# Patient Record
Sex: MALE | Race: WHITE | NOT HISPANIC OR LATINO | Employment: OTHER | URBAN - METROPOLITAN AREA
[De-identification: names, ages, dates, MRNs, and addresses within clinical notes are randomized per-mention and may not be internally consistent; named-entity substitution may affect disease eponyms.]

---

## 2017-04-03 ENCOUNTER — OFFICE VISIT (OUTPATIENT)
Dept: INTERNAL MEDICINE | Facility: CLINIC | Age: 72
End: 2017-04-03

## 2017-04-03 ENCOUNTER — OFFICE VISIT (OUTPATIENT)
Dept: AUDIOLOGY | Facility: CLINIC | Age: 72
End: 2017-04-03

## 2017-04-03 VITALS
SYSTOLIC BLOOD PRESSURE: 125 MMHG | TEMPERATURE: 97.7 F | BODY MASS INDEX: 27.11 KG/M2 | HEART RATE: 59 BPM | DIASTOLIC BLOOD PRESSURE: 80 MMHG | OXYGEN SATURATION: 98 % | WEIGHT: 189.4 LBS | HEIGHT: 70 IN

## 2017-04-03 DIAGNOSIS — H90.5 SENSORINEURAL HEARING LOSS: ICD-10-CM

## 2017-04-03 DIAGNOSIS — Z76.89 ENCOUNTER TO ESTABLISH CARE: ICD-10-CM

## 2017-04-03 DIAGNOSIS — R60.0 EDEMA OF RIGHT LOWER EXTREMITY: ICD-10-CM

## 2017-04-03 DIAGNOSIS — H93.13 TINNITUS, BILATERAL: ICD-10-CM

## 2017-04-03 DIAGNOSIS — H90.3 SENSORINEURAL HEARING LOSS, BILATERAL: Primary | ICD-10-CM

## 2017-04-03 DIAGNOSIS — Z00.00 ROUTINE HISTORY AND PHYSICAL EXAMINATION OF ADULT: Primary | ICD-10-CM

## 2017-04-03 PROBLEM — K21.9 GASTROESOPHAGEAL REFLUX DISEASE: Status: ACTIVE | Noted: 2017-03-01

## 2017-04-03 RX ORDER — FAMOTIDINE 20 MG
TABLET ORAL
COMMUNITY

## 2017-04-03 RX ORDER — ZOLPIDEM TARTRATE 10 MG/1
10 TABLET ORAL
COMMUNITY
Start: 2016-08-09

## 2017-04-03 RX ORDER — AMOXICILLIN 500 MG/1
CAPSULE ORAL
COMMUNITY
Start: 2016-10-17

## 2017-04-03 RX ORDER — LEVOTHYROXINE SODIUM 125 UG/1
125 TABLET ORAL
COMMUNITY
Start: 2016-04-06

## 2017-04-03 RX ORDER — ACETAMINOPHEN 500 MG
500 TABLET ORAL
COMMUNITY
Start: 2016-07-29

## 2017-04-03 RX ORDER — ATENOLOL 25 MG/1
TABLET ORAL
COMMUNITY
Start: 2017-02-09

## 2017-04-03 RX ORDER — LEVOFLOXACIN 750 MG/1
TABLET, FILM COATED ORAL
COMMUNITY
Start: 2017-03-20

## 2017-04-03 RX ORDER — SILDENAFIL 50 MG/1
25 TABLET, FILM COATED ORAL
COMMUNITY
Start: 2017-03-02

## 2017-04-03 RX ORDER — SIMVASTATIN 20 MG
TABLET ORAL
COMMUNITY
Start: 2016-09-06

## 2017-04-03 RX ORDER — ASPIRIN 325 MG
325 TABLET ORAL
COMMUNITY

## 2017-04-03 ASSESSMENT — PAIN SCALES - GENERAL: PAINLEVEL: NO PAIN (1)

## 2017-04-03 NOTE — NURSING NOTE
Chief Complaint   Patient presents with     Physical     Patient here for a physical for work.     Imelda Taveras LPN at 7:23 AM on 4/3/2017.

## 2017-04-03 NOTE — MR AVS SNAPSHOT
After Visit Summary   4/3/2017    Juan Beckett    MRN: [id number]           Patient Information     Date Of Birth          1945        Visit Information        Provider Department      4/3/2017 11:00 AM Dyan Bolivar AuD [facility] Audiology        Today's Diagnoses     Sensorineural hearing loss, bilateral    -  1    Tinnitus, bilateral           Follow-ups after your visit        Who to contact     Please call your clinic at [phone] to:    Ask questions about your health    Make or cancel appointments    Discuss your medicines    Learn about your test results    Speak to your doctor   If you have compliments or concerns about an experience at your clinic, or if you wish to file a complaint, please contact [facility] Patient Relations at [phone] or email us at [email or web address]         Additional Information About Your Visit        MyChart Information     Hassle.com is an electronic gateway that provides easy, online access to your medical records. With Hassle.com, you can request a clinic appointment, read your test results, renew a prescription or communicate with your care team.     To sign up for Apax Solutionst visit the website at [email or web address]   You will be asked to enter the access code listed below, as well as some personal information. Please follow the directions to create your username and password.     Your access code is: [email or web address]  Expires: 2017  6:30 AM     Your access code will  in 90 days. If you need help or a new code, please contact your [facility] or call [phone] for assistance.        Care EveryWhere ID     This is your Care EveryWhere ID. This could be used by other organizations to access your [place] medical records  [id number]         Blood Pressure from Last 3 Encounters:   17 125/80    Weight from Last 3 Encounters:   17 85.9 kg (189 lb 6.4 oz)               We Performed the Following     AUDIOGRAM/TYMPANOGRAM - INTERFACE     [facility] Audiometry Thrshld Eval & Speech Recog (01771)     Tymps / Reflex   (62969)        Primary Care Provider    None Specified       No primary provider on file.        Thank you!     Thank you for choosing [facility] AUDIOLOGY  for your care. Our goal is always to provide you with excellent care. Hearing back from our patients is one way we can continue to improve our services. Please take a few minutes to complete the written survey that you may receive in the mail after your visit with us. Thank you!             Your Updated Medication List - Protect others around you: Learn how to safely use, store and throw away your medicines at www.disposemymeds.org.          This list is accurate as of: 4/3/17 12:50 PM.  Always use your most recent med list.                   Brand Name Dispense Instructions for use    acetaminophen 500 MG tablet    TYLENOL     Take 500 mg by mouth       amoxicillin 500 MG capsule    AMOXIL     TAKE 4 CAPSULES BY MOUTH 1 HOUR BEFORE DENTAL APPOINTMENT       aspirin 325 MG tablet      Take 325 mg by mouth       atenolol 25 MG tablet    TENORMIN     TAKE 1 TABLET BY MOUTH TWICE DAILY       esomeprazole 20 MG CR capsule    nexIUM     Take 20 mg by mouth       levofloxacin 750 MG tablet    LEVAQUIN     TAKE 1 TABLET(750 MG) BY MOUTH EVERY DAY AS DIRECTED       levothyroxine 125 MCG tablet    SYNTHROID/LEVOTHROID     Take 125 mcg by mouth       MULTIVITAMIN & MINERAL PO      Take 1 tablet by mouth       sildenafil 50 MG cap/tab    REVATIO/VIAGRA     Take 25 mg by mouth       simvastatin 20 MG tablet    ZOCOR     TAKE 1 TABLET BY MOUTH EVERY NIGHT AT BEDTIME.       Vitamin D (Cholecalciferol) 1000 UNITS Caps          zolpidem 10 MG tablet    AMBIEN     Take 10 mg by mouth

## 2017-04-03 NOTE — MR AVS SNAPSHOT
After Visit Summary   4/3/2017    Juan Beckett    MRN: [id number]           Patient Information     Date Of Birth          1945        Visit Information        Provider Department      4/3/2017 7:30 AM Kisha Mccullough APRN [facility]        Today's Diagnoses     Sensorineural hearing loss    -  1    Edema of right lower extremity           Follow-ups after your visit        Additional Services     AUDIOLOGY ADULT REFERRAL       Your provider has referred you to: [facility]: Audiology and Aural Rehab Services - [place] [phone]   [email or web address]    Specialty Testing:  Audiogram w/Tymps and Reflexes (Comprehensive Audiology Evaluation)                  Your next 10 appointments already scheduled     Apr 03, 2017  9:45 AM CDT   US LOWER EXTREMITY VENOUS DUPLEX RIGHT with UCUS2   [facility] US ([facility])    [address] [place] [address]  1st [facility] [address] [phone]           Please bring a list of your medicines (including vitamins, minerals and over-the-counter drugs). Also, tell your doctor about any allergies you may have. Wear comfortable clothes and leave your valuables at home.  You do not need to do anything special to prepare for your exam.  Please call the Imaging Department at your exam site with any questions.            Apr 03, 2017 11:00 AM CDT   (Arrive by 10:45 AM)   Hearing Evaluation with Piotr Rueda   [facility] ([facility])    [address] [place] [address]  4th Floor  [facility] [address] [phone]           Please see your medical professional for ear cleaning prior to this appointment if you believe wax buildup may be an issue. All patients are required to have a physician's order stating the medical reason for the hearing test. Your doctor can send an electronic order, use their own form  or we have provided a form (called Physician's Order for Audiology Services). It states that there is a medical reason for your exam. Without an order you may need to be rescheduled until the order can be obtained.              Future tests that were ordered for you today     Open Future Orders        Priority Expected Expires Ordered    US Lower Extremity Venous Duplex Right Routine  4/3/2018 4/3/2017            Who to contact     Please call your clinic at [phone] to:    Ask questions about your health    Make or cancel appointments    Discuss your medicines    Learn about your test results    Speak to your doctor   If you have compliments or concerns about an experience at your clinic, or if you wish to file a complaint, please contact [facility] Patient Relations at [phone] or email us at [email or web address]         Additional Information About Your Visit        Amino AppsharROCKI Information     Ampere is an electronic gateway that provides easy, online access to your medical records. With Ampere, you can request a clinic appointment, read your test results, renew a prescription or communicate with your care team.     To sign up for Ampere visit the website at [email or web address]   You will be asked to enter the access code listed below, as well as some personal information. Please follow the directions to create your username and password.     Your access code is: [email or web address]  Expires: 2017  6:30 AM     Your access code will  in 90 days. If you need help or a new code, please contact your [facility] or call [phone] for assistance.        Care EveryWhere ID     This is your Care EveryWhere ID. This could be used by other organizations to access your [place] medical records  [id number]        Your Vitals Were     Pulse Temperature Height Pulse Oximetry BMI (Body Mass Index)       59 97.7  F (36.5  C) (Oral) 1.78 m  "(5' 10.08\") 98% 27.11 kg/m2        Blood Pressure from Last 3 Encounters:   04/03/17 125/80    Weight from Last 3 Encounters:   04/03/17 85.9 kg (189 lb 6.4 oz)              We Performed the Following     AUDIOLOGY ADULT REFERRAL        Primary Care Provider    None Specified       No primary provider on file.        Thank you!     Thank you for choosing [facility] PRIMARY CARE CLINIC  for your care. Our goal is always to provide you with excellent care. Hearing back from our patients is one way we can continue to improve our services. Please take a few minutes to complete the written survey that you may receive in the mail after your visit with us. Thank you!             Your Updated Medication List - Protect others around you: Learn how to safely use, store and throw away your medicines at www.disposemymeds.org.          This list is accurate as of: 4/3/17  8:57 AM.  Always use your most recent med list.                   Brand Name Dispense Instructions for use    acetaminophen 500 MG tablet    TYLENOL     Take 500 mg by mouth       amoxicillin 500 MG capsule    AMOXIL     TAKE 4 CAPSULES BY MOUTH 1 HOUR BEFORE DENTAL APPOINTMENT       aspirin 325 MG tablet      Take 325 mg by mouth       atenolol 25 MG tablet    TENORMIN     TAKE 1 TABLET BY MOUTH TWICE DAILY       esomeprazole 20 MG CR capsule    nexIUM     Take 20 mg by mouth       levofloxacin 750 MG tablet    LEVAQUIN     TAKE 1 TABLET(750 MG) BY MOUTH EVERY DAY AS DIRECTED       levothyroxine 125 MCG tablet    SYNTHROID/LEVOTHROID     Take 125 mcg by mouth       MULTIVITAMIN & MINERAL PO      Take 1 tablet by mouth       sildenafil 50 MG cap/tab    REVATIO/VIAGRA     Take 25 mg by mouth       simvastatin 20 MG tablet    ZOCOR     TAKE 1 TABLET BY MOUTH EVERY NIGHT AT BEDTIME.       Vitamin D (Cholecalciferol) 1000 UNITS Caps          zolpidem 10 MG tablet    AMBIEN     Take 10 mg by mouth         "

## 2017-04-03 NOTE — PROGRESS NOTES
SUBJECTIVE:  Juan Beckett is a 72 year old male with pmh of   Past Medical History:   Diagnosis Date     Autoimmune hypothyroidism      CKD (chronic kidney disease) stage 3, GFR 30-59 ml/min      GERD (gastroesophageal reflux disease)      Insomnia      Osteoarthritis of both knees      Paroxysmal atrial fibrillation (H)      Sensorineural hearing loss      Total knee replacement status 2016    Bilateral; R knee became infected and had to be replaced 07/2016     Who comes in for preventive examination, cognitive evaluation, and physical for clearance for work.    Classical paroxysmal atrial fib. Takes atenolol. Ablation for flutter in 12/2016, improved. No nitro ever needed.   L knee replaced, then R knee replaced 2+ years ago, never felt right, found to be infected with staph. Knee replacement was removed in May '16, had 6 wks IV vanco, then replaced July '216. Still doesn't bend like it should, is swollen and painful. Sees PT, now lymphedema specialists.  No recent hearing test; does feel he has some hearing loss and tinnitus related to the Vanco for his knee joint infection.     Medications and allergies were reviewed by me today.     Family History   Problem Relation Age of Onset     Sjogren's Mother      DIABETES Mother      Celiac Disease Daughter      DIABETES Nephew      insulin-dependent     Nephrolithiasis Father        Social History   Substance Use Topics     Smoking status: Never Smoker     Smokeless tobacco: Not on file     Alcohol use Yes      Comment: Rarely, 0.5 glass wine every other week.       Review Of Systems  Constitutional: no fevers, chills, night sweats, some weight gain over the past year, less active d/t knee replacement  Eyes: no vision change, diplopia or red eyes; eye exam 12/16  Ears, Nose, Mouth, Throat: see HPI +tinnitus or hearing change, no epistaxis, +vasomotor rhinitis, hx septal deviation, no oral lesions, throat clear  Cardiovascular: no chest pain, frequent palpitations  "(atrial flutter), no pain with walking, no orthopnea or PND   Respiratory: no dyspnea, cough, intermittent shortness of breath (r/t deconditioning--is improving), no wheezing   GI: no nausea, vomiting, diarrhea or constipation, no abdominal pain   : no change in urine, hx kidney stones, no dysuria or hematuria  Musculoskeletal: hx R shoulder entrapment syndrome, R ankle pain (arthritis--no redness or swelling)--R knee abnormal gait  Integumentary: no concerning lesions or moles, small mole on chest--can't remember in the past   Neuro: no loss of strength or sensation, no numbness or tingling, no tremor, no dizziness, no headache   Endo: no polyuria or polydipsia, no temperature intolerance   Heme/Lymph: no concerning bumps, no bleeding problems   Allergy: no environmental allergies   Psych: no depression or anxiety, +insomnia (uses Ambien)      OBJECTIVE:  [ need 2-7 for level 3-4 and 8-12 for level 5 ]  /80  Pulse 59  Temp 97.7  F (36.5  C) (Oral)  Ht 1.78 m (5' 10.08\")  Wt 85.9 kg (189 lb 6.4 oz)  SpO2 98%  BMI 27.11 kg/m2    GENERAL APPEARANCE: healthy, alert and no distress  EYES: Eyes grossly normal to inspection, PERRL and conjunctivae and sclerae normal  HENT: ear canals and TM's normal, nose and mouth without ulcers or lesions and TM's pearly grey, normal light reflex bilateral  NECK: no adenopathy, no asymmetry, masses, or scars and thyroid normal to palpation  RESP: lungs clear to auscultation - no rales, rhonchi or wheezes  CV: regular rates and rhythm, normal S1 S2, no S3 or S4 and no murmur, click or rub  LYMPHATICS: normal ant/post cervical and supraclavicular nodes  GI: soft, nontender, without hepatosplenomegaly or masses  : no hernias, no scrotal swelling or mass  MS: significant swelling in R knee and RLE, wearing compression stockings, no calf pain or erythema  SKIN: no suspicious lesions or rashes, varicosities - lower legs, kim and dry lower legs, small palpable cord in R " posterior calf.  NEURO: Normal strength and tone, mentation intact, speech normal and cranial nerves grossly intact  PSYCH: mentation appears normal, affect normal/bright and mini mental status exam performed, see results.    ASSESSMENT/PLAN:  Pt is a 72 year old male here for preventive examination    1. 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  MoCA performed, score: 30/30. Patient is without evidence of cognitive decline. Job duties reviewed, no physical or mental impairments identified that would interfere with ability to perform job requirements, paperwork signed to continue with practice.   - C COGNITION ASSESSED AND REVIEWED    2. Sensorineural hearing loss  Refer for hearing testing per pt request.   - AUDIOLOGY ADULT REFERRAL    3. Edema of right lower extremity  Ongoing edema with small palpable cord in RLE. R/o DVT, though discussed more likely related to lymphedema s/p knee replacement. Continue working with PT and start lymphedema wraps to help with swelling.   - US Lower Extremity Venous Duplex Right; Future    4. Encounter to establish care  Records reviewed from [employer].      FOLLOW UP: as needed for any changes or concerns, otherwise for routine health maintenance in 1 year (PCP at [employer] plans on retiring soon)    Colorectal screening not indicated,  Previously done, due 2024  Osteoporosis screening not indicated.  Previously done in 2015  Immunizations reviewed and updated in EPIC  Labs ordered  None    JAGUAR Penny CNP

## 2017-04-03 NOTE — PROGRESS NOTES
"AUDIOLOGY REPORT    SUBJECTIVE:  Juan Beckett is a 72 year old male who was seen in the Audiology Clinic at the [facility] for audiologic evaluation, referred by Kisha MCCORMACK.  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  He reports a known mild sensorineural hearing loss.  He reports that at the time of his last hearing evaluation, he was told that his hearing was not poor enough to consider amplification. The patient reports a that he received vancomycin through an IV in the Summer of 2016 due to an infection following knee surgery.  He stated that the vancomycin has caused bilateral constant tinnitus.  He is unsure if his hearing has declined but did note that he is beginning to notice some difficulties hearing. The patient denies bilateral otalgia, bilateral drainage, dizziness concerns, and history of noise exposure. He reported occasional right aural fullness.     OBJECTIVE:  Otoscopic exam indicates ears are clear of cerumen bilaterally     Pure Tone Thresholds assessed using conventional audiometry with good reliability from 250-8000 Hz bilaterally using insert earphones and circumaural headphones     RIGHT:  Normal sloping moderate at 3 kHz rising to mild at 6 kHz sloping back to moderate at 8 kHz sensorineural hearing loss    LEFT:    Normal sloping to moderate at 3 kHz rising to mild sensorineural hearing loss  *Asymmetry noted at 8 kHz    Tympanogram:    RIGHT: normal eardrum mobility    LEFT:   normal eardrum mobility    Reflexes (reported by stimulus ear):  RIGHT: Ipsilateral is present at normal levels  RIGHT: Contralateral is present at normal levels  LEFT:   Ipsilateral is present at normal levels  LEFT:   Contralateral is present at normal levels    Speech Reception Threshold:    RIGHT: 20 dB HL    LEFT:   20 dB HL    Word Recognition Score:     RIGHT: 92% at 60 dB HL using NU-6 " recorded word list.    LEFT:   96% at 60 dB HL using NU-6 recorded word list.      ASSESSMENT:   Today s results were discussed with the patient in detail.     PLAN:  Patient was counseled regarding hearing loss and impact on communication.  Patient is a good candidate for amplification at this time.  It is recommended that the patient follow-up with ENT regarding the asymmetry at 8 kHz.  A trial with amplification bilaterally was recommended.  It was also recommended that he have his hearing evaluated annually to monitor hearing status, or sooner if concerns arise, due to the history of vancomycin.  Please call this clinic with questions regarding these results or recommendations.        Piotr Rueda  Audiologist  [place] License  #[id number]

## 2017-04-12 ENCOUNTER — OFFICE VISIT (OUTPATIENT)
Dept: AUDIOLOGY | Facility: CLINIC | Age: 72
End: 2017-04-12

## 2017-04-12 DIAGNOSIS — H90.3 SENSORINEURAL HEARING LOSS, BILATERAL: Primary | ICD-10-CM

## 2017-04-12 DIAGNOSIS — H93.13 TINNITUS, BILATERAL: ICD-10-CM

## 2017-04-12 NOTE — MR AVS SNAPSHOT
After Visit Summary   4/12/2017    Juan Beckett    MRN: [id number]           Patient Information     Date Of Birth          1945        Visit Information        Provider Department      4/12/2017 12:00 PM Dyan Bolivar AuD [facility]        Today's Diagnoses     Sensorineural hearing loss, bilateral    -  1    Tinnitus, bilateral           Follow-ups after your visit        Your next 10 appointments already scheduled     May 12, 2017  8:00 AM CDT   Hearing Aid Fitting with Piotr Rueda [facility] ([facility])    [address] [place] [id number] [phone]            Jun 08, 2017 10:30 AM CDT   (Arrive by 10:15 AM)   Initial Review Program with Piotr Rueda [facility] ([facility])    [address] [place] [id number] [phone]              Who to contact     Please call your clinic at [phone] to:    Ask questions about your health    Make or cancel appointments    Discuss your medicines    Learn about your test results    Speak to your doctor   If you have compliments or concerns about an experience at your clinic, or if you wish to file a complaint, please contact [facility] Patient Relations at [phone] or email us at [email or web address]         Additional Information About Your Visit        MyChart Information     Slidebeant is an electronic gateway that provides easy, online access to your medical records. With Hometapper, you can request a clinic appointment, read your test results, renew a prescription or communicate with your care team.     To sign up for Slidebeant visit the website at [email or web address]   You will be asked to enter the access code listed below, as well as some personal information. Please follow the directions to create your username and password.      Your access code is: [email or web address]  Expires: 2017  6:30 AM     Your access code will  in 90 days. If you need help or a new code, please contact your [facility] or call [phone] for assistance.        Care EveryWhere ID     This is your Care EveryWhere ID. This could be used by other organizations to access your [facility] medical records  [id number]         Blood Pressure from Last 3 Encounters:   17 125/80    Weight from Last 3 Encounters:   17 85.9 kg (189 lb 6.4 oz)              We Performed the Following     Hearing Aid Exam, Binaural (84937)        Primary Care Provider    None Specified       No primary provider on file.        Thank you!     Thank you for choosing [facility] AUDIOLOGY  for your care. Our goal is always to provide you with excellent care. Hearing back from our patients is one way we can continue to improve our services. Please take a few minutes to complete the written survey that you may receive in the mail after your visit with us. Thank you!             Your Updated Medication List - Protect others around you: Learn how to safely use, store and throw away your medicines at www.disposemymeds.org.          This list is accurate as of: 17  1:34 PM.  Always use your most recent med list.                   Brand Name Dispense Instructions for use    acetaminophen 500 MG tablet    TYLENOL     Take 500 mg by mouth       amoxicillin 500 MG capsule    AMOXIL     TAKE 4 CAPSULES BY MOUTH 1 HOUR BEFORE DENTAL APPOINTMENT       aspirin 325 MG tablet      Take 325 mg by mouth       atenolol 25 MG tablet    TENORMIN     TAKE 1 TABLET BY MOUTH TWICE DAILY       esomeprazole 20 MG CR capsule    nexIUM     Take 20 mg by mouth       levofloxacin 750 MG tablet    LEVAQUIN     TAKE 1 TABLET(750 MG) BY MOUTH EVERY DAY AS DIRECTED       levothyroxine 125 MCG tablet    SYNTHROID/LEVOTHROID     Take 125 mcg by mouth       MULTIVITAMIN & MINERAL PO       Take 1 tablet by mouth       sildenafil 50 MG cap/tab    REVATIO/VIAGRA     Take 25 mg by mouth       simvastatin 20 MG tablet    ZOCOR     TAKE 1 TABLET BY MOUTH EVERY NIGHT AT BEDTIME.       Vitamin D (Cholecalciferol) 1000 UNITS Caps          zolpidem 10 MG tablet    AMBIEN     Take 10 mg by mouth

## 2017-04-12 NOTE — PROGRESS NOTES
AUDIOLOGY REPORT    SUBJECTIVE: Juan Beckett is a 72 year old male was seen in the Audiology Clinic at  [facility] on 4/12/17 to discuss concerns with hearing and functional communication difficulties.  Juan has been seen previously on 4/3/17, and results revealed a normal hearing sloping to moderate at 3 kHz rising to mild sensorineural hearing loss in the left ear and normal hearing sloping to moderate at 3 kHz rising to mild at 6 kHz and sloping back to moderate at 8 kHz sensorineural hearing loss in the right ear. Juan notes difficulty with communication in a variety of listening situations, most notably he stated that he has difficulty understanding his wife.  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  He states that he has trouble in background noise as well as needs the volume on the TV increased.  He denies any difficulty on the phone.  Juan stated that he is most excited about seeing how music sounds with hearing aids as he plays the piano.    OBJECTIVE:  Patient is a hearing aid candidate. Patient would like to move forward with a hearing aid evaluation today. Therefore, the patient was presented with different options for amplification to help aid in communication. Discussed styles, levels of technology and monaural vs. binaural fitting.  Information regarding using hearing aids with maskers was also discussed as he does have bilateral tinnitus which is thought to be related to previously being treated with Vancomycin.  Two options were quoted to Juan and he would like to take the information home and discuss the options with his wife.    The hearing aids quoted were:  Binaural: Widex Beyond 330  COLOR: Anastasiia Lopez  EARMOLD/TIPS: open dome  CANAL/ LENGTH: 3    The hearing aids quoted were:  Binaural: Phonak Audeo B50  COLOR:  Champagne  BATTERY SIZE: 312  EARMOLD/TIPS: open dome  CANAL/ LENGTH: 3    Amplified stethoscopes were discussed with Juan as well.  It is something he would be interested in as he is not sure what he might be missing.  He was given the [employer] brochure with the amplified stethoscope information.    ASSESSMENT:     Reviewed purchase information and warranty information with patient. The 45 day trial period was explained to patient. The patient was given a copy of the [place] Department of Health consumer brochure on purchasing hearing instruments. Patient risk factors have been provided to the patient in writing prior to the sale of the hearing aid per FDA regulation. The risk factors are also available in the User Instructional Booklet to be presented on the day of the hearing aid fitting. Juan will contact the clinic when he chooses which hearing aid he would like. Hearing aid evaluation completed.    PLAN: Juan is scheduled to return in 2-3 weeks for a hearing aid fitting and programming.  He will contact this clinic at least 2 weeks prior to the hearing aid fitting (which is currently scheduled for 5/12/17) with which hearing aid he would like the clinic to order. Purchase agreement will be completed on that date. Please contact this clinic with any questions or concerns.      Piotr Rueda  Audiologist  [place] License  #[id number]

## 2017-04-27 ENCOUNTER — TELEPHONE (OUTPATIENT)
Dept: AUDIOLOGY | Facility: CLINIC | Age: 72
End: 2017-04-27

## 2017-04-27 NOTE — TELEPHONE ENCOUNTER
Received email from patient stating that he would like to move forward with the Phonak hearing aids which were discussed at the consultation.  Hearing aids will be ordered    Piotr Rueda  Audiologist  [place] License  #[id number]

## 2017-05-12 ENCOUNTER — OFFICE VISIT (OUTPATIENT)
Dept: AUDIOLOGY | Facility: CLINIC | Age: 72
End: 2017-05-12

## 2017-05-12 DIAGNOSIS — H90.3 SENSORINEURAL HEARING LOSS, BILATERAL: Primary | ICD-10-CM

## 2017-05-12 NOTE — PROGRESS NOTES
"AUDIOLOGY REPORT    SUBJECTIVE: Juan Beckett is a 72 year old male who was seen in the Audiology Clinic at the [facility] for a fitting of binaural Phonak Audeo B50-RITE hearing aids. Previous results have revealed a bilateral essentially mild high frequency sensorineural hearing loss. He is excited to try the new hearing aids and see if there is any noticeable improvement.      OBJECTIVE: A listening check revealed that the hearing aids sounded crisp and clear with no distortion or weakness noted. The hearing aid conformity evaluation was completed.The hearing aids were placed and they provided a good fit. Real-ear-probe-microphone measurements were completed on the Pin or Peg system and were a good match to NAL-NL1 target with soft sounds audible, moderate sounds comfortable, and loud sounds below discomfort. UCLs are verified through maximum power output measures and demonstrate appropriate limiting of loud inputs. Juan was oriented to proper hearing aid use, care, cleaning (no water, dry brush), batteries (size 312, insertion/removal, toxicity, low-battery signal), aid insertion/removal, user booklet, warranty information, storage cases, and other hearing aid details. The patient confirmed understanding of hearing aid use and care, and showed proper insertion of hearing aid and batteries while in the office today.    He reported that things sounded very \"echoey\" and just a little bit of \"sharp/tinniness\" quality to the sound.  It was discussed that the \"tinny\" sound comes from those high frequency sounds that he cannot hear.  The high frequency sounds were decreased about 3 dB and the overall gain was decreased to 80% with it set to auto-acclimatize to 100% over the course of a month.  Juan reported good volume and sound quality today.   Hearing aids were programmed as follows:  Program 1:Everyday  Program button was deactivated.    EARS FIT: Binaural  HEARING AID MODEL NAME:  Phonak " Audelizabetho B50  HEARING AID STYLE: RITE  EARMOLDS/TIP/ LINK: 3xS with Medium open domes  SERIAL NUMBERS: Right: [id number]; Left: [id number]  WARRANTY END DATE: 7/25/2019    ASSESSMENT: Binaural hearing aids were fit today. Verification measures were performed. Juan signed the Hearing Aid Purchase Agreement and was given a copy, as well as details on his hearing aids.    PLAN:Juan will return for follow-up in 2-3 weeks for a hearing aid review appointment. Please call this clinic with questions regarding today s appointment.    Piotr Rueda  Audiologist  [place] License  #[id number]

## 2017-05-12 NOTE — MR AVS SNAPSHOT
After Visit Summary   2017    Juan Beckett    MRN: [id number]           Patient Information     Date Of Birth          1945        Visit Information        Provider Department      2017 8:05 AM Dyan Bolivar [facility] Audiology        Today's Diagnoses     Sensorineural hearing loss, bilateral    -  1       Follow-ups after your visit        Your next 10 appointments already scheduled     2017 10:30 AM CDT   (Arrive by 10:15 AM)   Initial Review Program with [facility] Audiology ([facility] [place])    [address] [place] [id number] [phone]              Who to contact     Please call your clinic at [phone] to:    Ask questions about your health    Make or cancel appointments    Discuss your medicines    Learn about your test results    Speak to your doctor   If you have compliments or concerns about an experience at your clinic, or if you wish to file a complaint, please contact [facility] Patient Relations at [phone] or email us at [email or web address]         Additional Information About Your Visit        MyChart Information     Veltit is an electronic gateway that provides easy, online access to your medical records. With Arsanis, you can request a clinic appointment, read your test results, renew a prescription or communicate with your care team.     To sign up for Veltit visit the website at [email or web address]   You will be asked to enter the access code listed below, as well as some personal information. Please follow the directions to create your username and password.     Your access code is: [email or web address]  Expires: 2017  6:30 AM     Your access code will  in 90 days. If you need help or a new code, please contact your [facility] Clinic or call [phone] for assistance.        Care  EveryWhere ID     This is your Care EveryWhere ID. This could be used by other organizations to access your [place] medical records  [id number]         Blood Pressure from Last 3 Encounters:   04/03/17 125/80    Weight from Last 3 Encounters:   04/03/17 85.9 kg (189 lb 6.4 oz)              We Performed the Following     Hearing Aid Fitting        Primary Care Provider    None Specified       No primary provider on file.        Thank you!     Thank you for choosing [facility] AUDIOLOGY  for your care. Our goal is always to provide you with excellent care. Hearing back from our patients is one way we can continue to improve our services. Please take a few minutes to complete the written survey that you may receive in the mail after your visit with us. Thank you!             Your Updated Medication List - Protect others around you: Learn how to safely use, store and throw away your medicines at www.disposemymeds.org.          This list is accurate as of: 5/12/17  2:40 PM.  Always use your most recent med list.                   Brand Name Dispense Instructions for use    acetaminophen 500 MG tablet    TYLENOL     Take 500 mg by mouth       amoxicillin 500 MG capsule    AMOXIL     TAKE 4 CAPSULES BY MOUTH 1 HOUR BEFORE DENTAL APPOINTMENT       aspirin 325 MG tablet      Take 325 mg by mouth       atenolol 25 MG tablet    TENORMIN     TAKE 1 TABLET BY MOUTH TWICE DAILY       esomeprazole 20 MG CR capsule    nexIUM     Take 20 mg by mouth       levofloxacin 750 MG tablet    LEVAQUIN     TAKE 1 TABLET(750 MG) BY MOUTH EVERY DAY AS DIRECTED       levothyroxine 125 MCG tablet    SYNTHROID/LEVOTHROID     Take 125 mcg by mouth       MULTIVITAMIN & MINERAL PO      Take 1 tablet by mouth       sildenafil 50 MG cap/tab    REVATIO/VIAGRA     Take 25 mg by mouth       simvastatin 20 MG tablet    ZOCOR     TAKE 1 TABLET BY MOUTH EVERY NIGHT AT BEDTIME.       Vitamin D (Cholecalciferol) 1000 UNITS Caps          zolpidem 10 MG tablet     AMBIEN     Take 10 mg by mouth

## 2017-06-08 ENCOUNTER — OFFICE VISIT (OUTPATIENT)
Dept: AUDIOLOGY | Facility: CLINIC | Age: 72
End: 2017-06-08

## 2017-06-08 DIAGNOSIS — H90.3 SENSORINEURAL HEARING LOSS, BILATERAL: Primary | ICD-10-CM

## 2017-06-08 NOTE — PROGRESS NOTES
"AUDIOLOGY REPORT    BACKGROUND INFORMATION: Juan Beckett was seen in the Audiology Clinic at the [facility] [place] on 6/8/2017 for follow-up.  The patient has been seen previously in this clinic on 4/12/17 and results revealed sensorineural hearing loss bilaterally.  The patient was fit with binaural Phonak Audeo B50 RITE hearing aids on 5/12/17.  The patient reports that there is still an \"echo and metallic\" sound quality to the hearing aids and that the left hearing aid is physically uncomfortable and he feels that it is reaching the bony portion of the ear canal.  Overall he is happy with the hearing aids.    TEST RESULTS AND PROCEDURES: A first follow-up was performed.  Adjustments were made including turning the high frequency gain (3-7 kHz) down 2 dB and the patient reported things sounded a little better.  A volume control was added.    Otoscopy revealed clear ear canals bilaterally. The left  length was changed from a 3 to a 2 and he stated that it seemed to be more comfortable.    SUMMARY AND RECOMMENDATIONS: A first follow-up was performed today and the patient reports that he is happy with the hearing aids.  Adjustments were made as noted above.  He stated that he plans to keep the hearing aids but did stated he will let me know prior to June 26th (end of 45 day trial period) if he changes his mind. If he decides to keep them, it was recommended that the patient will return as needed or at least every 4-6 months for cleaning and assessment of hearing aid.  Call this clinic with questions regarding today s visit.    Piotr Rueda  Audiologist  [place] License  #[id number]          "

## 2017-06-08 NOTE — MR AVS SNAPSHOT
After Visit Summary   2017    Juan Beckett    MRN: [id number]           Patient Information     Date Of Birth          1945        Visit Information        Provider Department      2017 10:30 AM Dyan Bolivar [facility] Audiology        Today's Diagnoses     Sensorineural hearing loss, bilateral    -  1       Follow-ups after your visit        Who to contact     Please call your clinic at [phone] to:    Ask questions about your health    Make or cancel appointments    Discuss your medicines    Learn about your test results    Speak to your doctor   If you have compliments or concerns about an experience at your clinic, or if you wish to file a complaint, please contact [facility] Phys[facility] Patient Relations at [phone] or email us at [email or web address]         Additional Information About Your Visit        MyChart Information     Elegant Servicet is an electronic gateway that provides easy, online access to your medical records. With [employer]ers, you can request a clinic appointment, read your test results, renew a prescription or communicate with your care team.     To sign up for Elegant Servicet visit the website at [email or web address]   You will be asked to enter the access code listed below, as well as some personal information. Please follow the directions to create your username and password.     Your access code is: [email or web address]  Expires: 2017  6:30 AM     Your access code will  in 90 days. If you need help or a new code, please contact your [facility] Physicians Clinic or call [phone] for assistance.        Care EveryWhere ID     This is your Care EveryWhere ID. This could be used by other organizations to access your [place] medical records  [id number]         Blood Pressure from Last 3 Encounters:   17 125/80    Weight from Last 3 Encounters:   17 85.9 kg (189 lb 6.4 oz)              We Performed  the Following     No Charge, Hearing Aid Clinic Visit        Primary Care Provider    None Specified       No primary provider on file.        Thank you!     Thank you for choosing [facility] AUDIOLOGY  for your care. Our goal is always to provide you with excellent care. Hearing back from our patients is one way we can continue to improve our services. Please take a few minutes to complete the written survey that you may receive in the mail after your visit with us. Thank you!             Your Updated Medication List - Protect others around you: Learn how to safely use, store and throw away your medicines at www.disposemymeds.org.          This list is accurate as of: 6/8/17  1:46 PM.  Always use your most recent med list.                   Brand Name Dispense Instructions for use    acetaminophen 500 MG tablet    TYLENOL     Take 500 mg by mouth       amoxicillin 500 MG capsule    AMOXIL     TAKE 4 CAPSULES BY MOUTH 1 HOUR BEFORE DENTAL APPOINTMENT       aspirin 325 MG tablet      Take 325 mg by mouth       atenolol 25 MG tablet    TENORMIN     TAKE 1 TABLET BY MOUTH TWICE DAILY       esomeprazole 20 MG CR capsule    nexIUM     Take 20 mg by mouth       levofloxacin 750 MG tablet    LEVAQUIN     TAKE 1 TABLET(750 MG) BY MOUTH EVERY DAY AS DIRECTED       levothyroxine 125 MCG tablet    SYNTHROID/LEVOTHROID     Take 125 mcg by mouth       MULTIVITAMIN & MINERAL PO      Take 1 tablet by mouth       sildenafil 50 MG cap/tab    REVATIO/VIAGRA     Take 25 mg by mouth       simvastatin 20 MG tablet    ZOCOR     TAKE 1 TABLET BY MOUTH EVERY NIGHT AT BEDTIME.       Vitamin D (Cholecalciferol) 1000 UNITS Caps          zolpidem 10 MG tablet    AMBIEN     Take 10 mg by mouth

## 2017-06-21 ENCOUNTER — OFFICE VISIT (OUTPATIENT)
Dept: AUDIOLOGY | Facility: CLINIC | Age: 72
End: 2017-06-21

## 2017-06-21 DIAGNOSIS — H90.3 SENSORINEURAL HEARING LOSS, BILATERAL: Primary | ICD-10-CM

## 2017-06-21 NOTE — MR AVS SNAPSHOT
After Visit Summary   2017    Juan Beckett    MRN: [id number]           Patient Information     Date Of Birth          1945        Visit Information        Provider Department      2017 3:00 PM Dyan Bolivar [facility] Audiology        Today's Diagnoses     Sensorineural hearing loss, bilateral    -  1       Follow-ups after your visit        Who to contact     Please call your clinic at [phone] to:    Ask questions about your health    Make or cancel appointments    Discuss your medicines    Learn about your test results    Speak to your doctor   If you have compliments or concerns about an experience at your clinic, or if you wish to file a complaint, please contact [facility] Patient Relations at [phone] or email us at [email or web address]         Additional Information About Your Visit        MyChart Information     Givitt is an electronic gateway that provides easy, online access to your medical records. With Red Stamp, you can request a clinic appointment, read your test results, renew a prescription or communicate with your care team.     To sign up for Givitt visit the website at [email or web address]   You will be asked to enter the access code listed below, as well as some personal information. Please follow the directions to create your username and password.     Your access code is: [email or web address]  Expires: 2017  6:30 AM     Your access code will  in 90 days. If you need help or a new code, please contact your [facility] Clinic or call [phone] for assistance.        Care EveryWhere ID     This is your Care EveryWhere ID. This could be used by other organizations to access your [place] medical records  [id number]         Blood Pressure from Last 3 Encounters:   17 125/80    Weight from Last 3 Encounters:   17 85.9 kg (189 lb 6.4 oz)              We Performed  the Following     No Charge, Hearing Aid Clinic Visit        Primary Care Provider    None Specified       No primary provider on file.        Equal Access to Services     [facility] Center[facility] : Hadii aad ku hadloribeatrice Hannon, zanada angelairisha, esther espinosa casimirokailasruthi, waxay idiin haybrayansharon mirandachitramargarita wadsworth. So [facility] [phone].    ATENCIÓN: Si habla español, tiene a moreno disposición servicios gratuitos de asistencia lingüística. Llame al [phone].    We comply with applicable federal civil rights laws and [place] laws. We do not discriminate on the basis of race, color, national origin, age, disability sex, sexual orientation or gender identity.            Thank you!     Thank you for choosing [facility] AUDIOLOGY  for your care. Our goal is always to provide you with excellent care. Hearing back from our patients is one way we can continue to improve our services. Please take a few minutes to complete the written survey that you may receive in the mail after your visit with us. Thank you!             Your Updated Medication List - Protect others around you: Learn how to safely use, store and throw away your medicines at www.disposemymeds.org.          This list is accurate as of: 6/21/17  4:00 PM.  Always use your most recent med list.                   Brand Name Dispense Instructions for use Diagnosis    acetaminophen 500 MG tablet    TYLENOL     Take 500 mg by mouth        amoxicillin 500 MG capsule    AMOXIL     TAKE 4 CAPSULES BY MOUTH 1 HOUR BEFORE DENTAL APPOINTMENT        aspirin 325 MG tablet      Take 325 mg by mouth        atenolol 25 MG tablet    TENORMIN     TAKE 1 TABLET BY MOUTH TWICE DAILY        esomeprazole 20 MG CR capsule    nexIUM     Take 20 mg by mouth        levofloxacin 750 MG tablet    LEVAQUIN     TAKE 1 TABLET(750 MG) BY MOUTH EVERY DAY AS DIRECTED        levothyroxine 125 MCG tablet    SYNTHROID/LEVOTHROID     Take 125 mcg by mouth        MULTIVITAMIN & MINERAL PO      Take 1 tablet by  mouth        sildenafil 50 MG cap/tab    REVATIO/VIAGRA     Take 25 mg by mouth        simvastatin 20 MG tablet    ZOCOR     TAKE 1 TABLET BY MOUTH EVERY NIGHT AT BEDTIME.        Vitamin D (Cholecalciferol) 1000 UNITS Caps           zolpidem 10 MG tablet    AMBIEN     Take 10 mg by mouth

## 2017-06-21 NOTE — PROGRESS NOTES
AUDIOLOGY REPORT    BACKGROUND INFORMATION: Juan Beckett was seen in the Audiology Clinic at the [facility] and [facility] [place] on 6/21/2017 for follow-up.  The patient has been seen previously in this clinic on 4/12/17 and results revealed sensorineural hearing loss bilaterally.  The patient was fit with binaural Phonak Audeo B50 RITE hearing aids on 5/12/17.  He returns today due to continued slight discomfort in the left ear.  He is happy with the fit of the right hearing aid.    TEST RESULTS AND PROCEDURES: The left  was shortened from a length #2 to #1, it reportedly felt better in the ear but did not sit quite correctly above the tragus/helix. The  was switched back to #2 and a large dome was placed.  He reports that felt better.  He opened and closed his jaw several times and reported that he did not experience the same discomfort with the large dome as with the medium dome.    SUMMARY AND RECOMMENDATIONS: A second follow-up was performed today and the patient reports continued slight discomfort with the left hearing aid in the ear canal.  Left hearing aid dome was changed from medium to a large.  The trial has been extended from 6/26/17 to 7/7/17 to be sure that the left hearing aid is comfortable.  He reports he will be going out of town for a month in about a week and so will contact the provider before he leaves regarding the comfort of the left hearing aid. Call this clinic with questions regarding today s visit.      Piotr Rueda  Audiologist  [place] License  #[id number]

## 2017-07-19 ENCOUNTER — TELEPHONE (OUTPATIENT)
Dept: AUDIOLOGY | Facility: CLINIC | Age: 72
End: 2017-07-19

## 2017-07-19 NOTE — TELEPHONE ENCOUNTER
Received e-mail from patient stating that he has yet to receive a bill for the hearing aids.  He also reports that he received a call from a [employer] agency regarding the money which was very concerning. He was fit with the hearing aids about 2 months ago and so it was puzzling as to why it would have been sent to collections so quickly.  He was encouraged to call the billing office at: [phone] with his questions as they will be able to appropriately answer his questions.  Let him know that I will also be conferring with my supervisor regarding his concern and will reach out with any other recommendations.    Dyan Bolivar, Piotr  Audiologist  [place] License  #[id number]

## 2019-09-13 ENCOUNTER — OFFICE VISIT (OUTPATIENT)
Dept: AUDIOLOGY | Facility: CLINIC | Age: 74
End: 2019-09-13
Payer: COMMERCIAL

## 2019-09-13 DIAGNOSIS — H90.3 SENSORINEURAL HEARING LOSS, BILATERAL: Primary | ICD-10-CM

## 2019-09-13 NOTE — PROGRESS NOTES
AUDIOLOGY REPORT    SUBJECTIVE:Juan Beckett is a 74 year old male who was seen in the Audiology Clinic at the [facility] on 9/13/2019  for a check regarding binaural hearing aids. Previous results have revealed a sensorineural hearing loss bilaterally.  The patient has been seen previously in this clinic and was fit with binaural Phonak Audeo B50 -in-the-ear hearing aids on 5/12/17.  Juan reports that he can not tell a difference in conversations when he is wearing the hearing and when he is not. He also notes that he has not been wearing the hearing aids consistently recently since he has not noticed a lot of benefit and he has since retired and alone during the day. Patient notes that his wife has had more difficulty hearing lately as well and is more empathic to his communication difficulties. .    OBJECTIVE:   Hearing aids were cleaned and checked. Listening check revealed that the amplifiers in both hearing aids were not functioning properly, a shortened electroacoustic check confirmed that both devices were weak. Because of this, no programming changes were made today. Patient was curious as to why they would stop working and he was informed that this could be for a number of reasons including moisture/humidity. Patient was given the option of sending hearing aids in for repair with 6 month warranty of 12 month warranty as the devices are no longer under warranty. Patient elected to send hearing aids in for repair with 12 month warranty. Patient also asked for guidance in communication with his wife now that she has start to notice a hearing loss, he was provided a communication strategy guide to share with his wife regarding good communication strategies.    ASSESSMENT: A follow-up hearing aid check was completed today. Hearing aids will be sent in for repair. Communication tip sheet was provided.     PLAN:Juan will be contacted when hearing aids are back from repair and  can be picked up at the clinic. It was recommended that he have his hearing evaluated as it has been a little over two years since his last hearing test, patient was in agreement with plan and requested an appointment to have both the hearing test and his hearing aids programmed the same day.  Patient has been scheduled for a [facility] appointment on 11/4/2019.  He was encouraged to wear the hearing aids when they come back from repair and prior to his appointment to assess benefit with the devices.  Please call this clinic with any questions regarding today s appointment.    Nicole Patino M.S.  Audiology Doctoral Extern  License #[id number]        I was present with the patient for the entire Audiology appointment including all procedures/testing performed by the AuD student, and agree with the student s assessment and plan as documented.      Dyan Bolivar, Piotr  Audiologist  [place] License  #[id number]

## 2019-10-04 ENCOUNTER — TELEPHONE (OUTPATIENT)
Dept: AUDIOLOGY | Facility: CLINIC | Age: 74
End: 2019-10-04

## 2019-10-04 NOTE — TELEPHONE ENCOUNTER
Audiology assistant Tai Boggs left message on voicemail a few days ago letting Patient know repaired devices are in and ready for pick-up. Called and talked to patient to confirm that they are in office, he will  in the near future.    Brigitte Levy.  Licensed Audiologist  [place] #[id number]        [facility]    Phone Message    May a detailed message be left on voicemail: yes    Reason for Call: Other: Patient is calling requesting the status on his hearing aides. Please call to discuss thank you.      Action Taken: Message routed to:  [facility] ([facility] Audiology

## 2019-11-04 ENCOUNTER — OFFICE VISIT (OUTPATIENT)
Dept: AUDIOLOGY | Facility: CLINIC | Age: 74
End: 2019-11-04
Payer: COMMERCIAL

## 2019-11-04 DIAGNOSIS — H90.3 SENSORINEURAL HEARING LOSS (SNHL), BILATERAL: Primary | ICD-10-CM

## 2019-11-04 NOTE — PROGRESS NOTES
AUDIOLOGY REPORT    SUBJECTIVE:  Juan Beckett is a 74 year old male who was seen in Audiology at the [facility] for audiologic evaluation and hearing aid check.  The patient has been seen previously for a hearing evaluation at this facility 4/3/2017 and results indicated normal to moderate sensorineural hearing loss bilaterally with a 25 dB asymmetry noted 8000 Hz, right ear poorer. The patient was fit with binaural Phonak Audeo   in the canal hearing aids 5/12/2017 but reports that he has lacked clarity of speech since being fit with the devices. He also notes some discomfort when wearing the right-ear device. The patient denies other ear related issues or vertigo.    OBJECTIVE:  Fall Risk Screen:  1. Have you fallen two or more times in the past year? No  2. Have you fallen and had an injury in the past year? No    Abuse Screening:  Do you feel unsafe at home or work/school? No  Do you feel threatened by someone? No  Does anyone try to keep you from having contact with others, or doing things outside of your home? No  Physical signs of abuse present? No    Otoscopic exam indicates ears are clear of cerumen bilaterally     Pure Tone Thresholds assessed using conventional audiometry with good  reliability from 250-8000 Hz bilaterally using circumaural headphones and reassessed using insert earphones    RIGHT:  Normal sloping to moderate sensorineural hearing loss; 15-20 dB decrease 8497-9199 Hz    LEFT:    Normal sloping to moderate rising to borderline normal sensorineural hearing loss; stable    Tympanogram:    RIGHT: normal eardrum mobility    LEFT:   normal eardrum mobility    Reflexes (reported by stimulus ear):  RIGHT: Ipsilateral is present at normal levels  RIGHT: Contralateral is present at normal levels  LEFT:   Ipsilateral is present at normal levels  LEFT:   Contralateral is present at normal levels    Speech Reception Threshold:    RIGHT: 20  dB HL    LEFT:   15 dB HL  Word Recognition Score:     RIGHT: 96% at 60 dB HL using NU-6 recorded word list; stable    LEFT:   100% at 55 dB HL using NU-6 recorded word list; stable    The hearing aids were deep-cleaned and assessed using electroacoustic analysis and found to be functioning well. Real-ear verification from the patient's fitting was reviewed and found to be under target in the high-frequencies, and appointment notes from hearing aid adjustments after the fitting indicate that the high-frequencies were turned down further per patient request.     The hearing aids were reprogrammed today with real-ear verification to confirm the fitting and results indicate each device is programmed to meet target. The patient reports good volume, sound quality and symmetry between ears today.    The patient has reported some discomfort with right hearing aid use. It was noted in previous appointment notes that the patient was having discomfort with use of the left device and so the domes were changed on both devices at that time (to large open). The right hearing aid dome was changed back to the original size (medium open) and the patient reports an improvement in comfort.     ASSESSMENT:  Stable normal sloping to moderate rising to borderline normal sensorineural hearing loss left ear, normal sloping to moderate sensorineural hearing loss right ear with 15-20 dB decrease 3877-5471 Hz. The patient's hearing aids were cleaned and assessed and are functioning to specifications. The hearing aids were reprogrammed with real-ear verification to confirm the new settings and the patient's right-ear hearing aid dome was changed due to the patient's report of discomfort.      PLAN:    It is recommended that the patient return every 4-6 months for cleaning and assessment of the hearing aids and at least bi-annually for testing to monitor for hearing status, sooner if changes in hearing or ear symptoms are noted.  Please call  this clinic with questions regarding these results or recommendations.      Brigitte Levy.  Licensed Audiologist  [place] #[id number]

## 2021-01-30 ENCOUNTER — HEALTH MAINTENANCE LETTER (OUTPATIENT)
Age: 76
End: 2021-01-30

## 2021-06-21 ENCOUNTER — OFFICE VISIT (OUTPATIENT)
Dept: AUDIOLOGY | Facility: CLINIC | Age: 76
End: 2021-06-21
Payer: COMMERCIAL

## 2021-06-21 DIAGNOSIS — H90.3 SENSORINEURAL HEARING LOSS, ASYMMETRICAL: ICD-10-CM

## 2021-06-21 DIAGNOSIS — H90.3 SENSORINEURAL HEARING LOSS (SNHL), BILATERAL: Primary | ICD-10-CM

## 2021-06-21 PROCEDURE — 92593 PR HEARING AID CHECK, BINAURAL: CPT | Performed by: AUDIOLOGIST

## 2021-06-21 NOTE — PROGRESS NOTES
"AUDIOLOGY REPORT    BACKGROUND INFORMATION: Juan Beckett was seen in the Audiology Clinic at [facility] on 6/21/2021 for follow-up.  The patient has been seen previously in this clinic on 11/4/2019 and results revealed bilateral asymmetric sensorineural hearing loss.   The patient has been seen previously in this clinic and was fit with binaural Phonak Audeo B50 -in-the-ear hearing aids on 5/12/17.  The patient reports that he has not been wearing the devices consistently. He notes that he feels they are more distorted, meaning that he is not getting the consonant sounds of speech clarity.    TEST RESULTS AND PROCEDURES:   The hearing aids were cleaned. A listening check indicated that the right hearing aid sounded crisp and clear with no distortion or weakness. The left hearing aid sounded very muffled. The  was changed in office as a troubleshooting measure and the sound quality did not improve. This was reviewed with the patient who agreed to send it in for repair, he would like the 6 month warranty (at a cost of $225).     The right hearing aid was connected to the computer. Real-ear measures confirmed that the hearing aid was programmed appropriately for his most recent hearing test (2019). He reported that the device had a bit of an \"edge\" to it, gain was reduced around 4 kHz by 3 dB and he noted a minimal improvement. The devices were decreased to 80% and will auto-acclimatize to targets in 21 days. He reported good sound quality and was in agreement with the plan.    SUMMARY AND RECOMMENDATIONS: A hearing aid check was performed today.  Adjustments were made as noted above and the patient will return for follow-up on 7/14/21 to  the repaired left hearing aid and make additional adjustments.  Call this clinic with questions regarding today s visit.      Piotr Rueda  Audiologist  [place] License  #[id number]        "

## 2021-07-14 ENCOUNTER — OFFICE VISIT (OUTPATIENT)
Dept: AUDIOLOGY | Facility: CLINIC | Age: 76
End: 2021-07-14
Payer: COMMERCIAL

## 2021-07-14 DIAGNOSIS — H90.3 SENSORINEURAL HEARING LOSS, BILATERAL: Primary | ICD-10-CM

## 2021-07-14 PROCEDURE — V5014 HEARING AID REPAIR/MODIFYING: HCPCS | Performed by: AUDIOLOGIST

## 2021-07-14 NOTE — PROGRESS NOTES
AUDIOLOGY REPORT    BACKGROUND INFORMATION: Juan Beckett was seen in the Audiology Clinic at [facility] on 7/14/2021 for follow-up.   The patient has been seen previously in this clinic on 11/4/2019 and results revealed bilateral asymmetric sensorineural hearing loss.   The patient has been seen previously in this clinic and was fit with binaural Phonak Audeo B50 -in-the-ear hearing aids on 5/12/17.  He is here today to  his repaired left hearing aid.    TEST RESULTS AND PROCEDURES: The hearing aids were connect to the computer. Auto-acclimatize was re-set to 80% in both ears and will increase over 28 days. He reported that the right hearing aid sounded slightly louder than the left hearing aid. Gain was decreased 2 dB 500-750 Hz in the right hearing aid and reported better balance. There was slight feedback in the right ear and so gain was decreased 3 dB 6-8 kHz in the right.    SUMMARY AND RECOMMENDATIONS: The patient returned today to  the repaired left hearing aid.  Adjustments were made as noted above and the patient will return for a hearing evaluation and hearing aid check in 6-12 months, sooner if concerns arise. The cost of the repair ($225) will be billed today.  Call this clinic with questions regarding today s visit.      Piotr Rueda  Audiologist  [place] License  #[id number]

## 2021-09-05 ENCOUNTER — HEALTH MAINTENANCE LETTER (OUTPATIENT)
Age: 76
End: 2021-09-05

## 2022-02-20 ENCOUNTER — HEALTH MAINTENANCE LETTER (OUTPATIENT)
Age: 77
End: 2022-02-20

## 2022-10-23 ENCOUNTER — HEALTH MAINTENANCE LETTER (OUTPATIENT)
Age: 77
End: 2022-10-23

## 2023-04-02 ENCOUNTER — HEALTH MAINTENANCE LETTER (OUTPATIENT)
Age: 78
End: 2023-04-02

## 2024-06-08 ENCOUNTER — HEALTH MAINTENANCE LETTER (OUTPATIENT)
Age: 79
End: 2024-06-08